# Patient Record
Sex: FEMALE | Race: WHITE
[De-identification: names, ages, dates, MRNs, and addresses within clinical notes are randomized per-mention and may not be internally consistent; named-entity substitution may affect disease eponyms.]

---

## 2017-05-20 NOTE — EDPHY
H & P


Stated Complaint: pt reports right arm, flank, and leg "swelling " since this am


Time Seen by Provider: 05/20/17 12:48


HPI/ROS: 





CHIEF COMPLAINT:  Right-sided swelling





HISTORY OF PRESENT ILLNESS:  The patient presents to the ED with complaints of 

perceived swelling to her right face, arm, trunk and legs.  She reportedly 

notice the symptoms when waking up today.  The patient denies any history of 

numbness or weakness.  She denies history of fall or trauma.  The patient 

denies significant comorbid medical problems.  Patient has no additional acute 

complaints.  She does have some chronic pain issues.  The patient denies recent 

medication changes.





REVIEW OF SYSTEMS:


A comprehensive 10 point review of systems is otherwise negative aside from 

elements mentioned in the history of present illness.


Source: Patient





- Personal History


LMP (Females 10-55): Now


Current Tetanus/Diphtheria Vaccine: Unsure


Current Tetanus Diphtheria and Acellular Pertussis (TDAP): Unsure


Tetanus Vaccine Date: < 10 YEARS





- Medical/Surgical History


Hx Asthma: No


Hx Chronic Respiratory Disease: No


Hx Diabetes: No


Hx Cardiac Disease: No


Hx Renal Disease: No


Hx Cirrhosis: No


Hx Alcoholism: No


Hx HIV/AIDS: No


Hx Splenectomy or Spleen Trauma: No


Other PMH: depression.  john





- Social History


Smoking Status: Current every day smoker





- Physical Exam


Exam: 





General Appearance:  Alert, no distress


Eyes:  Pupils equal and round no pallor or injection


ENT, Mouth:  Mucous membranes moist


Respiratory:  There are no retractions, lungs are clear to auscultation


Cardiovascular:  Regular rate and rhythm


Gastrointestinal:  Abdomen is soft and nontender, no masses, bowel sounds normal


Neurological:  A&O, normal motor function, normal sensory exam, normal cranial 

nerves


Skin:  Warm and dry, no rashes


Musculoskeletal:  Neck is supple nontender


Extremities:  symmetrical, full range of motion


Psychiatric:  Patient is oriented X 3, there is no agitation








Constitutional: 





 Initial Vital Signs











Temperature (C)  36.4 C   05/20/17 11:24


 


Heart Rate  78   05/20/17 11:24


 


Respiratory Rate  16   05/20/17 11:24


 


Blood Pressure  120/87 H  05/20/17 11:24


 


O2 Sat (%)  98   05/20/17 11:24








 











O2 Delivery Mode               Room Air














Allergies/Adverse Reactions: 


 





No Known Allergies Allergy (Verified 11/23/16 07:46)


 








Home Medications: 














 Medication  Instructions  Recorded


 


Tobramycin 0.3% [Tobrex 0.3% opht 1 drops OP Q6 #1 opht.btl 11/23/16





drops (*)]  


 


AMITRIPTYLINE HCL  05/20/17


 


Gabapentin  05/20/17


 


Hydroxyzine HCl  05/20/17














Medical Decision Making


ED Course/Re-evaluation: 





The patient is well-appearing.  I appreciate no asymmetric swelling on her 

physical exam.  The patient is neurologically intact.  The patient's vital 

signs are stable. I do not feel that further workup is indicated at this point 

time.  I have asked the patient to return to the ED for any markedly worsening 

swelling, the development of neurologic symptoms, fever, pain or other concerns.





Departure





- Departure


Disposition: Home, Routine, Self-Care


Clinical Impression: 


 Swelling of extremity





Condition: Good


Instructions:  Leg Edema (ED)


Additional Instructions: 


1. Please follow up with your regular physician as needed.


2. Please return to the ED for markedly worsening symptoms, numbness, weakness, 

fever, pain or other concerns.


Referrals: 


Soledad Larson, DO [Primary Care Provider] - As per Instructions

## 2017-05-21 NOTE — EDPHY
H & P


Stated Complaint: 2 days right side edema, numbness tingling, right low back 

pain, dizziness


Time Seen by Provider: 05/21/17 14:56


HPI/ROS: 





31-year-old female presents complaining tingling in her distal fingertips, and 

she feels like she had swelling in her right hand and right ankle that began 

yesterday however she states that is now improved.


She denies nausea vomiting .  She states she had 1 episode of a loose stool 

this afternoon.


She is currently in a rehab program denies any use of alcohol, recreational 

drugs.  Her last alcohol  intake was 54 days ago.


Her medications include gabapentin, amitriptyline and hydroxyzine.


No fevers or chills








Review of systems


As per HPI


General no fever no chills no weakness


HEENT no eye pain no eye discharge. No eye redness, no sore throat


Respiratory no cough, no shortness of breath


Cardiac no chest pain, no peripheral edema


GI no abdominal pain, no diarrhea, no constipation, no nausea, no vomiting


  no flank pain, no hematuria, no dysuria


Musculoskeletal no myalgias, no joint pain


Heme  no easy bruising, no easy bleeding


Endo no polyuria, no polydipsia


Skin no rashes, no pruritus


Neuro no syncope, no dizziness, no headaches, positive paresthesias in finger 

tips


Psych is no suicidal ideation, no homicidal ideation





Source: Patient


Exam Limitations: No limitations





- Personal History


LMP (Females 10-55): Now


Tetanus Vaccine Date: < 10 YEARS





- Medical/Surgical History


Hx Asthma: No


Hx Chronic Respiratory Disease: No


Hx Diabetes: No


Hx Cardiac Disease: No


Hx Renal Disease: No


Hx Cirrhosis: No


Hx Alcoholism: Yes


Hx HIV/AIDS: No


Hx Splenectomy or Spleen Trauma: No


Other PMH: depression.  john





- Family History


Significant Family History: No pertinent family hx





- Social History


Smoking Status: Current every day smoker


Alcohol Use: Sober





- Physical Exam


Exam: 





31-year-old female alert and oriented no acute distress nontoxic appearance 

afebrile


HEENT atraumatic normocephalic, extraocular muscles intact, anicteric


Oropharynx negative for erythema negative exudate, tolerating her own secretions


Neck supple no meningismus


Lungs clear to auscultation bilaterally


Heart regular rate and rhythm without murmur rub or gallop


Abdomen nondistended normoactive bowel sounds soft nontender


Back no CVA tenderness, no step-offs, no spinal tenderness


Extremities no cyanosis clubbing or edema


Neuro alert and oriented, no focal deficits


Constitutional: 


 Initial Vital Signs











Temperature (C)  36.8 C   05/21/17 14:09


 


Heart Rate  93   05/21/17 14:09


 


Respiratory Rate  18   05/21/17 14:09


 


Blood Pressure  105/75   05/21/17 14:09


 


O2 Sat (%)  97   05/21/17 14:09








 











O2 Delivery Mode               Room Air














Allergies/Adverse Reactions: 


 





No Known Allergies Allergy (Verified 11/23/16 07:46)


 








Home Medications: 














 Medication  Instructions  Recorded


 


Tobramycin 0.3% [Tobrex 0.3% opht 1 drops OP Q6 #1 opht.btl 11/23/16





drops (*)]  


 


AMITRIPTYLINE HCL  05/20/17


 


Gabapentin  05/20/17


 


Hydroxyzine HCl  05/20/17














Medical Decision Making


ED Course/Re-evaluation: 





Medical decision making and ER course


Patient seen and evaluated for finger tip paresthesias, intermittent since 

yesterday.





Labs obtained


CBC within normal limits, significant for increased MCV consistent with patient'

s history of alcoholism


CMP within normal limits except for mildly elevated ALT


TSH normal


Urinalysis within normal limits





Differential diagnosis considered


Electrolyte abnormality, hypo or hyperthyroidism





Impression


Paresthesias intermittent unknown etiology





Plan


Discharge


Advised to follow up with her primary care physician in the next 2-3 days if 

she continues to have symptoms


Also advised to discuss her current medication regimen with her prescribing 

psychiatrist asked whether that may have some of these symptoms as side effects.





- Data Points


Laboratory Results: 


 Laboratory Results





 05/21/17 15:30 





 05/21/17 15:30 





 











  05/21/17 05/21/17 05/21/17





  15:30 15:30 14:45


 


WBC    6.83 10^3/uL 10^3/uL  





    (3.80-9.50)  


 


RBC    3.86 10^6/uL L 10^6/uL  





    (4.18-5.33)  


 


Hgb    13.3 g/dL g/dL  





    (12.6-16.3)  


 


Hct    38.8 % %  





    (38.0-47.0)  


 


MCV    100.5 fL H fL  





    (81.5-99.8)  


 


MCH    34.5 pg H pg  





    (27.9-34.1)  


 


MCHC    34.3 g/dL g/dL  





    (32.4-36.7)  


 


RDW    12.1 % %  





    (11.5-15.2)  


 


Plt Count    238 10^3/uL 10^3/uL  





    (150-400)  


 


MPV    9.8 fL fL  





    (8.7-11.7)  


 


Neut % (Auto)    62.8 % %  





    (39.3-74.2)  


 


Lymph % (Auto)    27.2 % %  





    (15.0-45.0)  


 


Mono % (Auto)    7.3 % %  





    (4.5-13.0)  


 


Eos % (Auto)    2.0 % %  





    (0.6-7.6)  


 


Baso % (Auto)    0.4 % %  





    (0.3-1.7)  


 


Nucleat RBC Rel Count    0.0 % %  





    (0.0-0.2)  


 


Absolute Neuts (auto)    4.28 10^3/uL 10^3/uL  





    (1.70-6.50)  


 


Absolute Lymphs (auto)    1.86 10^3/uL 10^3/uL  





    (1.00-3.00)  


 


Absolute Monos (auto)    0.50 10^3/uL 10^3/uL  





    (0.30-0.80)  


 


Absolute Eos (auto)    0.14 10^3/uL 10^3/uL  





    (0.03-0.40)  


 


Absolute Basos (auto)    0.03 10^3/uL 10^3/uL  





    (0.02-0.10)  


 


Absolute Nucleated RBC    0.00 10^3/uL 10^3/uL  





    (0-0.01)  


 


Immature Gran %    0.3 % %  





    (0.0-1.1)  


 


Immature Gran #    0.02 10^3/uL 10^3/uL  





    (0.00-0.10)  


 


ESR    9 MM/HR MM/HR  





    (0-20)  


 


Sodium  141 mEq/L mEq/L    





   (134-144)   


 


Potassium  3.8 mEq/L mEq/L    





   (3.5-5.2)   


 


Chloride  105 mEq/L mEq/L    





   ()   


 


Carbon Dioxide  23 mEq/l mEq/l    





   (22-31)   


 


Anion Gap  13 mEq/L mEq/L    





   (8-16)   


 


BUN  9 mg/dL mg/dL    





   (7-23)   


 


Creatinine  0.6 mg/dL mg/dL    





   (0.6-1.0)   


 


Estimated GFR  > 60     





    


 


Glucose  126 mg/dL H mg/dL    





   ()   


 


Calcium  8.7 mg/dL mg/dL    





   (8.5-10.4)   


 


Magnesium  1.7 mg/dL mg/dL    





   (1.6-2.3)   


 


Total Bilirubin  0.5 mg/dL mg/dL    





   (0.1-1.4)   


 


AST  31 IU/L IU/L    





   (14-46)   


 


ALT  114 IU/L H IU/L    





   (9-52)   


 


Alkaline Phosphatase  75 IU/L IU/L    





   ()   


 


Total Protein  6.9 g/dL g/dL    





   (6.3-8.2)   


 


Albumin  3.8 g/dL g/dL    





   (3.5-5.0)   


 


TSH  0.532 uIU/mL uIU/mL    





   (0.465-4.680)   


 


Urine Color      YELLOW 





    


 


Urine Appearance      HAZY 





    


 


Urine pH      8.0  H 





     (5.0-7.5) 


 


Ur Specific Gravity      1.015 





     (1.002-1.030) 


 


Urine Protein      NEGATIVE 





     (NEGATIVE) 


 


Urine Ketones      NEGATIVE 





     (NEGATIVE) 


 


Urine Blood      NEGATIVE 





     (NEGATIVE) 


 


Urine Nitrate      NEGATIVE 





     (NEGATIVE) 


 


Urine Bilirubin      NEGATIVE 





     (NEGATIVE) 


 


Urine Urobilinogen      0.2 EU EU





     (0.2-1.0) 


 


Ur Leukocyte Esterase      NEGATIVE 





     (NEGATIVE) 


 


Urine Glucose      NEGATIVE 





     (NEGATIVE) 














Departure





- Departure


Disposition: Home, Routine, Self-Care


Clinical Impression: 


 Paresthesia





Condition: Good


Instructions:  Paresthesia (ED)


Additional Instructions: 


If you continue to have swelling or symptoms that your concerned with please 

follow-up with your primary care physician.


Referrals: 


Soledad Larson, DO [Primary Care Provider] - As per Instructions

## 2018-03-19 NOTE — PDANEPAE
ANE History of Present Illness





32 year old female for left breast excisional biopsy.





ANE Past Medical History





- Cardiovascular History


Hx Hypertension: No


Hx Arrhythmias: No


Hx Chest Pain: No


Hx Coronary Artery / Peripheral Vascular Disease: No


Hx CHF / Valvular Disease: No


Hx Palpitations: No





- Pulmonary History


Hx COPD: No


Hx Asthma/Reactive Airway Disease: No


Hx Recent Upper Respiratory Infection: No


Hx Oxygen in Use at Home: No


Hx Sleep Apnea: No


Sleep Apnea Screening Result - Last Documented: Negative





- Neurologic History


Hx Cerebrovascular Accident: No


Hx Seizures: No


Hx Dementia: No





- Endocrine History


Hx Diabetes: No





- Renal History


Hx Renal Disorders: No





- Liver History


Hx Hepatic Disorders: No





- Neurological & Psychiatric Hx


Hx Neurological and Psychiatric Disorders: Yes


Neurological / Psychiatric History Comment: anxiety, panic attacks pt uses 

marijuana





- Cancer History


Hx Cancer: No





- Congenital Disorder History


Hx Congenital Disorders: No





- GI History


Hx Gastrointestinal Disorders: Yes


Gastrointestinal History Comment: gallbladder removed





- Other Health History


Other Health History: breast wound draining 3/14





- Chronic Pain History


Chronic Pain: No





- Surgical History


Prior Surgeries: none





ANE Review of Systems


Review of Systems: 








- Exercise capacity


METS (RN): 5 METS





ANE Patient History





- Allergies


Allergies/Adverse Reactions: 








No Known Allergies Allergy (Verified 03/15/18 16:12)


 








- Home Medications


Home Medications: 








AMOXICILLIN  03/15/18 [Last Taken 03/17/18]








- NPO status


NPO Since - Liquids (Date): 03/19/18


NPO Since - Liquids (Time): 10:30


NPO Since - Solids (Date): 03/18/18


NPO Since - Solids (Time): 22:00





- Smoking Hx


Smoking Status: Current every day smoker





- Family Anes Hx


Family Hx Anesthesia Complications: none





ANE Labs/Vital Signs





- Vital Signs


Height: 167.64 cm


Weight: 50.802 kg





ANE Physical Exam





- Airway


Neck exam: FROM


Mallampati Score: Class 1


Mouth exam: normal dental/mouth exam





- Pulmonary


Pulmonary: no respiratory distress





- Cardiovascular


Cardiovascular: regular rate and rhythym





- ASA Status


ASA Status: I





ANE Anesthesia Plan


Anesthesia Plan: MAC

## 2018-03-19 NOTE — GOP
[f rep st]



                                                                OPERATIVE REPORT





DATE OF OPERATION:  



SURGEON:  Alfred Noble MD



ANESTHESIA:  General LMA anesthesia was used.



ANESTHESIOLOGIST:  Breana Denson MD



PREOPERATIVE DIAGNOSIS:  Breast abscess.



POSTOPERATIVE DIAGNOSIS:  Breast abscess.



PROCEDURE PERFORMED:  Excisional biopsy with culture, permanent pathology.



FINDINGS:  



SPECIMENS:  Left breast tissue to Permanent Pathology.  Culture for fungal, aerobic and anaerobic cul
tures.



INDICATIONS:  This is a 32-year-old woman who presents with a history of breast abscess, likely granu
lomatous mastitis, here for excisional biopsy, definitive treatment.



DESCRIPTION OF PROCEDURE:  The patient was brought to the operating room.  After induction of general
 LMA anesthesia, her left chest and breast were prepped with chlorhexidine and draped sterilely.  Preet
e-out procedure was then performed according to institutional standards.  Local anesthetic was infuse
d in skin and subcutaneous tissues of the nipple-areolar complex and around the abscess.  An incision
 was made curvilinear on the lateral aspect of the breast, deepened with electrocautery.  The entire 
abscess cavity was circumvented and excised.  Purulence was noted in the middle of the cavity.  This 
was sent for culture and aspirated, then irrigated until clear.  The area was assessed for any additi
onal tissue problems.  There was no additional problem.  Therefore, hemostasis was ensured and the ar
ea was closed using 3-0 Vicryl and 4-0 Monocryl.  Dermabond was applied.  LMA was removed, then the p
atient was taken to the recovery room in stable condition.  Needle, instrument, and sponge counts tonie
ified to be correct x2.



COMPLICATIONS:  There were no complications.





Job #:  [id number]

## 2018-03-19 NOTE — PDHPUP
History & Physical Update


H&P update statement: 


This history and physical update is based on an assessment of the patient which 

was completed after admission or registration (within 24 hours), but prior to 

the surgery/procedure.


drainage on Saturday after completion of abx


H&P update: H&P reviewed & patient examined, no change in patient's condition 

since H&P completed

## 2018-03-19 NOTE — POSTOPPROG
Post Op Note


Date of Operation: 03/19/18


Surgeon: Alfred Noble


Assistant: none


Anesthesiologist: MALLORY Denson


Anesthesia: LMA


Pre-op Diagnosis: Granulomatous mastitis


Post-op Diagnosis: same


Procedure: excisional biopsy


Findings: abscess 


Inf/Abcess present in the surg proc area at time of surgery?: Yes


Depth: Organ Space


EBL: Minimal


Complications: 





none


Specimen(s): 





left breast tissue


culture swab: afb, gm stain and routine culture

## 2018-11-02 ENCOUNTER — HOSPITAL ENCOUNTER (OUTPATIENT)
Dept: HOSPITAL 80 - FSGY | Age: 33
Discharge: HOME | End: 2018-11-02
Attending: SURGERY
Payer: MEDICAID

## 2018-11-02 VITALS — DIASTOLIC BLOOD PRESSURE: 75 MMHG | SYSTOLIC BLOOD PRESSURE: 123 MMHG

## 2018-11-02 DIAGNOSIS — N61.1: Primary | ICD-10-CM

## 2018-11-02 PROCEDURE — 0HBU0ZZ EXCISION OF LEFT BREAST, OPEN APPROACH: ICD-10-PCS | Performed by: SURGERY

## 2018-11-02 RX ADMIN — Medication PRN MCG: at 19:29

## 2018-11-02 RX ADMIN — Medication PRN MCG: at 19:58

## 2018-11-02 RX ADMIN — Medication PRN MCG: at 19:38

## 2018-11-02 NOTE — PDANEPAE
ANE History of Present Illness





32 year old female for I and D left breast.  Otherwise healthy.





ANE Past Medical History





- Cardiovascular History


Hx Hypertension: No


Hx Arrhythmias: No


Hx Chest Pain: No


Hx Coronary Artery / Peripheral Vascular Disease: No


Hx CHF / Valvular Disease: No


Hx Palpitations: No





- Pulmonary History


Hx COPD: No


Hx Asthma/Reactive Airway Disease: No


Hx Recent Upper Respiratory Infection: No


Hx Oxygen in Use at Home: No


Hx Sleep Apnea: No


Sleep Apnea Screening Result - Last Documented: Negative





- Neurologic History


Hx Cerebrovascular Accident: No


Hx Seizures: No


Hx Dementia: No





- Endocrine History


Hx Diabetes: No





- Renal History


Hx Renal Disorders: No





- Liver History


Hx Hepatic Disorders: No





- Neurological & Psychiatric Hx


Hx Neurological and Psychiatric Disorders: Yes


Neurological / Psychiatric History Comment: anxiety, panic attacks pt uses 

marijuana





- Cancer History


Hx Cancer: No





- Congenital Disorder History


Hx Congenital Disorders: No





- GI History


Hx Gastrointestinal Disorders: Yes


Gastrointestinal History Comment: gallbladder removed





- Other Health History


Other Health History: breast wound draining 3/14





- Chronic Pain History


Chronic Pain: No





- Surgical History


Prior Surgeries: I&D of L breast 2018.  cholecytectomy 2010.  L foot ORIF 2012





ANE Review of Systems


Review of systems is: negative


Review of Systems: 








- Exercise capacity


METS (RN): 5 METS





ANE Patient History





- Allergies


Allergies/Adverse Reactions: 








No Known Allergies Allergy (Verified 03/15/18 16:12)


 








- Home Medications


Home Medications: 








Ibuprofen  11/02/18 [Last Taken 11/02/18]


Keflex  11/02/18 [Last Taken 11/02/18]








- NPO status


NPO Since - Liquids (Date): 11/02/18


NPO Since - Liquids (Time): 12:00


NPO Since - Solids (Date): 11/02/18


NPO Since - Solids (Time): 09:30





- Smoking Hx


Smoking Status: Current every day smoker





- Family Anes Hx


Family Hx Anesthesia Complications: none





ANE Labs/Vital Signs





- Vital Signs


Blood Pressure: 100/68


Heart Rate: 84


Respiratory Rate: 15


O2 Sat (%): 98


Height: 165.1 cm


Weight: 51.165 kg





ANE Physical Exam





- Airway


Neck exam: FROM


Mallampati Score: Class 2


Mouth exam: normal dental/mouth exam





- Pulmonary


Pulmonary: no respiratory distress





- Cardiovascular


Cardiovascular: regular rate and rhythym





- ASA Status


ASA Status: I





ANE Anesthesia Plan


Anesthesia Plan: GA w LMA

## 2018-11-02 NOTE — POSTOPPROG
Post Op Note


Date of Operation: 11/02/18


Surgeon: Alfred Noble


Assistant: none


Anesthesiologist: MALLORY Denson


Anesthesia: LMA


Pre-op Diagnosis: Recurrent Left breast abscess


Post-op Diagnosis: same


Procedure: I&D with debridement


Inf/Abcess present in the surg proc area at time of surgery?: Yes


Depth: Organ Space


EBL: Minimal


Specimen(s): 





left breast tissue with abscess to path and culture (AFB, fungal and routine)

## 2018-11-11 NOTE — GOP
DATE OF OPERATION:  11/02/2018



SURGEON:  Alfred Noble MD



ASSISTANT:  None.



ANESTHESIA:  General LMA anesthesia.



ANESTHESIOLOGIST:  Breana Denson MD.



PREOPERATIVE DIAGNOSIS:  Recurrent left breast abscess.



POSTOPERATIVE DIAGNOSIS:  Recurrent left breast abscess.



PROCEDURE PERFORMED:  Incision and drainage with debridement.



FINDINGS:  





INDICATIONS:  32-year-old patient who presents with recurrent left breast abscess for incisional debr
idement.



DESCRIPTION OF PROCEDURE:  The patient was brought into the operating room after induction of general
 anesthetic.  Her left breast and chest were prepped with chlorhexidine in the normal sterile fashion
.  After a time-out procedure was performed according to institutional standards, local anesthetic in
fused in skin and subcutaneous tissues.  A curvilinear incision was made past the previous incision, 
deepened with electrocautery.  The abscess cavity was entered and incompletely debrided.  Fiducial ma
rkers that had been used at that place from previous debridement for marking in potential cancer were
 removed at the time of the surgery.  The area was irrigated and aspirated until clear.  Hemostasis w
as assured.  The debridement was carried down to healthy tissue.  Circumferentially the nipple was re
leased from its somewhat retracted position.  After ensuring complete hemostasis and no further infec
tion, the area was reapproximated using 3-0 Vicryl and 3-0 nylon sutures.  The area was dressed steri
jeff, the patient awakened, and taken to the recovery room.  Some samples were sent for Pathology as 
well as for culture, routine aerobic, anaerobic, fungal, and AFB.  The patient tolerated the procedur
e well.





Job #:  [id number]